# Patient Record
(demographics unavailable — no encounter records)

---

## 2025-03-18 NOTE — HEALTH RISK ASSESSMENT
[Good] : ~his/her~  mood as  good [Monthly or less (1 pt)] : Monthly or less (1 point) [PHQ-2 Negative - No further assessment needed] : PHQ-2 Negative - No further assessment needed [Time Spent: ___ Minutes] : I spent [unfilled] minutes performing a depression screening for this patient. [Never] : Never [MammogramDate] : never  [PapSmearDate] : never [ColonoscopyDate] : age 23

## 2025-03-18 NOTE — HISTORY OF PRESENT ILLNESS
[FreeTextEntry1] : 43 yo female here as a new pt and for annual PE. Feels well. Has not had a physical in several years.

## 2025-03-18 NOTE — PLAN
[FreeTextEntry1] : check annual routine bloodwork medication reconciliation performed advised healthy diet/exercise discussed vaccines- had covid and flu vaccines in the fall.  Depression screen done & reviewed 5 minutes  Routine- Pt  has never had mammogram or Pap smear, advised her to start going yearly for these exams. Also advised her to come yearly for CPE.  h/o anemia- check CBC